# Patient Record
(demographics unavailable — no encounter records)

---

## 2024-11-04 NOTE — HEALTH RISK ASSESSMENT
[No] : In the past 12 months have you used drugs other than those required for medical reasons? No [No falls in past year] : Patient reported no falls in the past year [0] : 2) Feeling down, depressed, or hopeless: Not at all (0) [Never] : Never [de-identified] : URO [[id number]] : 0

## 2024-11-04 NOTE — HEALTH RISK ASSESSMENT
[No] : In the past 12 months have you used drugs other than those required for medical reasons? No [No falls in past year] : Patient reported no falls in the past year [0] : 2) Feeling down, depressed, or hopeless: Not at all (0) [Never] : Never [de-identified] : URO [[id number]] : 0

## 2024-11-04 NOTE — HISTORY OF PRESENT ILLNESS
[de-identified] : 58 year old  female patient with history of stable Essential Hypertension, Chronic Systolic CHF, Nonischemic Cardiomyopathy, Acquired Hypothyroidism, Reactive Airway Disease, Elevated Hemoglobin A1c, history as stated, presented for follow up examination. Patient is compliant with all medications. ROS as stated.

## 2024-11-04 NOTE — ASSESSMENT
[FreeTextEntry1] : 58 year old female found to have stable Essential Hypertension, Chronic Systolic CHF, Nonischemic Cardiomyopathy, Acquired Hypothyroidism, Reactive Airway Disease, Elevated Hemoglobin A1c, with the current prescription regimen as recommended, diet and lifestyle modifications, as counseled. Prior results reviewed, interpreted and discussed with the patient during today's examination, as appropriate. Follow up, treatment plan and tests, as ordered.   Total time spent:: 30 minutes Including: Preparation prior to visit - Reviewing prior record, results of tests and Consultation Reports as applicable Conducting an appropriate H & P during today's encounter Appropriate orders for tests, medications and procedures, as applicable Counseling patient Note completion

## 2024-11-04 NOTE — HISTORY OF PRESENT ILLNESS
[de-identified] : 58 year old  female patient with history of stable Essential Hypertension, Chronic Systolic CHF, Nonischemic Cardiomyopathy, Acquired Hypothyroidism, Reactive Airway Disease, Elevated Hemoglobin A1c, history as stated, presented for follow up examination. Patient is compliant with all medications. ROS as stated.

## 2025-01-10 NOTE — HISTORY OF PRESENT ILLNESS
[de-identified] : MICHAEL STEIN is a 58 year old female presenting with PMH of cardiomyopathy presenting today for 1-1/2-week history of left-sided foot pain without any specific trauma.  Patient states she goes to the gym regularly a few times a week and does not believe she has had an increase in activity typically uses the treadmill and elliptical.  She has switched to the rowing machine due to the pain recently.  She saw her primary care doctor and was referred over for further evaluation.  Denies any significant swelling or bruising of the ankle.  Most of the pain is on the anterior aspect of the foot and ankle down to the arch of the foot.  Does not have any pain at the heel itself.

## 2025-01-10 NOTE — PHYSICAL EXAM
[de-identified] : Foot/ankle (left)   Inspection  Skin: normal  Swelling: none  Arch: Pes planus, flexible worse with weightbearing Tendon defect: none   Palpation  Tenderness: Moderate Location: Tib post tendon insertion   Ankle ROM  Dorsiflexion: normal  Plantarflexion: normal  Eversion: normal  Inversion: normal, painful Toe flexion: normal  Toe extension: normal  Foot Motor Strength  Ankle plantarflexion: 5/5  Dorsiflexion: 5/5  Inversion: 5/5  Eversion: 5/5   Sensory index  Normal Distal pulses Normal   Stress test  Negative: ankle anterior drawer, ankle lateral tilt, subtalar inversion, subtalar eversion  Positive:   Special Tests  Knott test: normal  Kleigers test: normal  Tinnels: normal

## 2025-01-10 NOTE — DISCUSSION/SUMMARY
[de-identified] : MICHAEL STEIN is a 58 year old female presenting with PMH of cardiomyopathy presenting with 1 to 2-week history of left-sided foot and ankle pain that appears to be related to tibialis posterior tendinitis and dysfunction.  Patient also with moderate pes planus on weightbearing.  Plan: 1.  Home exercise program provided for foot intrinsic strengthening 2.  Discussed Green Superfeet inserts to be worn to help with arch support 3.  Patient may continue ankle support and brace as needed 4.  Patient may continue exercise that does not flareup her pain more than a 3 out of 10 5.  Patient will follow-up as needed can consider formal PT in the future 6.  Recommend topical medication such as Aspercreme Biofreeze and/or Tylenol for pain control.

## 2025-02-03 NOTE — HEALTH RISK ASSESSMENT
[No] : In the past 12 months have you used drugs other than those required for medical reasons? No [No falls in past year] : Patient reported no falls in the past year [0] : 2) Feeling down, depressed, or hopeless: Not at all (0) [Never] : Never [de-identified] : ORTHO [[id number]] : 0

## 2025-02-03 NOTE — HEALTH RISK ASSESSMENT
[No] : In the past 12 months have you used drugs other than those required for medical reasons? No [No falls in past year] : Patient reported no falls in the past year [0] : 2) Feeling down, depressed, or hopeless: Not at all (0) [Never] : Never [de-identified] : ORTHO [[id number]] : 0

## 2025-02-03 NOTE — HISTORY OF PRESENT ILLNESS
[de-identified] : 58 year old  female patient with history of stable Essential Hypertension, Chronic Systolic CHF, Nonischemic Cardiomyopathy, Acquired Hypothyroidism, Reactive Airway Disease, Elevated Hemoglobin A1c, history as stated, presented for follow up examination. Patient is compliant with all medications. ROS as stated.

## 2025-02-03 NOTE — HISTORY OF PRESENT ILLNESS
[de-identified] : 58 year old  female patient with history of stable Essential Hypertension, Chronic Systolic CHF, Nonischemic Cardiomyopathy, Acquired Hypothyroidism, Reactive Airway Disease, Elevated Hemoglobin A1c, history as stated, presented for follow up examination. Patient is compliant with all medications. ROS as stated.

## 2025-02-25 NOTE — ASSESSMENT
[FreeTextEntry1] : Very pleasant 57-year-old woman who presents for follow-up of right kidney stone -Ultrasound images reviewed demonstrating no kidney stones, bilaterally, whereas previous ultrasound 6 months ago demonstrated a 5.3 mm mm right lower pole renal stone without significant change and no new stones -We discussed potential etiologies of nonvisualization of a stone previously visualized on renal ultrasound -Repeat renal ultrasound in 6 months and follow-up at that time   I have spent 20 minutes on this encounter, exclusive of separately billed services

## 2025-02-25 NOTE — HISTORY OF PRESENT ILLNESS
[FreeTextEntry1] : Very pleasant 58-year-old woman who presents for follow-up of right lower pole kidney stone.  She feels well.  She denies dysuria.  No hematuria.  No flank pain or suprapubic pain.  She underwent a repeat renal ultrasound today which demonstrated no kidney stones, bilaterally.  Ultrasound 6 months ago demonstrated a 5.3 mm right lower pole renal stone.  No other complaints.

## 2025-05-16 NOTE — HISTORY OF PRESENT ILLNESS
[FreeTextEntry1] : Ms. 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   For full initial details, please see note from 5/1/23.   Since last visit, has been stable. Prior remote transmission showed multiple SVT/ST events, longest event lasting ~11 minutes and 33 seconds with rates of 143bpm. BB not increased at that time as she recalls being lightheaded on higher doses. Has not had any noted on recent interrogation.   States she is unable to work out for more than ~30mins which is less than previously when she was able to perform 1-hour sessions.   Being followed by weight management for her weight. Wasn't given GLP-1 in past due to insurance issues and possible gastroparesis.   She denies chest pain and no CRT-D shocks.   Has had episodic lightheadedness which is debilitating, sometimes position related. Stays hydrated.

## 2025-05-16 NOTE — ASSESSMENT
[FreeTextEntry1] : Ms. 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 Currently stage C HF, endorsing NYHA class I-II symptoms and appears euvolemic on exam. Prior ischemic testing to evaluate for CAD showed defects which are unlikely due to epicardial CAD as prior deficits were present in 2016 despite normal angiogram and repeat study without worsening or new areas with defects. However, prior basal septal territory with fixed defect suggestive of scarring.  1. HF recovered EF - - 11/2023 TTE with LVEF 51% - Counseled on weight loss and diet; continue follow up with nutritionist has done well with diet and exercise; may be good candidate for gastric sleeve vs. Ozempic; encouraged f/u with Dr. Welch - on Entresto  BID; will reduce to 49/51 twice/day to see if lightheadedness improves - c/w Toprol XL 50 mg daily; HR at goal and previously intolerant of higher doses.  - continue spironolactone 25 mg daily  2. Dyspnea -  - pharmacologic stress test 8/4/21 with less ischemia and normal LVEF; unlikely d/t epicardial CAD and may represent microvascular disease. - lipid panel in February LDL 82 - Follows Dr. Franco - Event monitor 2024   3. Weight loss - as above - A1c 5.7 from 6.0 - continue metformin 1500 mg daily - Follows Dr. Franco  RTC 6 months with PA

## 2025-05-16 NOTE — PHYSICAL EXAM
[Well Developed] : well developed [Well Nourished] : well nourished [No Acute Distress] : no acute distress [Normal Conjunctiva] : normal conjunctiva [No Carotid Bruit] : no carotid bruit [Normal S1, S2] : normal S1, S2 [No Murmur] : no murmur [No Rub] : no rub [No Gallop] : no gallop [Clear Lung Fields] : clear lung fields [Good Air Entry] : good air entry [No Respiratory Distress] : no respiratory distress  [Soft] : abdomen soft [Non Tender] : non-tender [No Masses/organomegaly] : no masses/organomegaly [Normal Bowel Sounds] : normal bowel sounds [Normal Gait] : normal gait [No Edema] : no edema [No Cyanosis] : no cyanosis [No Clubbing] : no clubbing [No Varicosities] : no varicosities [No Rash] : no rash [No Skin Lesions] : no skin lesions [Moves all extremities] : moves all extremities [No Focal Deficits] : no focal deficits [Normal Speech] : normal speech [Alert and Oriented] : alert and oriented [Normal memory] : normal memory [de-identified] : JVP ~ 6 cm  w/o HJR [de-identified] : warm peripherally

## 2025-05-16 NOTE — CARDIOLOGY SUMMARY
Medication/Dose: sumatriptan 50 mg  Patient last seen by PCP: 03/26/2021  Next office visit with PCP: none scheduled   Last Lab:09/28/2020    Above information requires contacting patient: Yes, medication was just refilled 12/21/2021    Prescription does not require PDMP check    Sent to provider to approve or deny       [de-identified] : 5/16/25 - NSR, BiV paced 5/13/24 ECG: NSR, BiV paced 11/6/23 ECG: NSR, HR 68, low voltage in limb leads 5/1/23 ECG: NSR, BiV paced, low voltage in limb leads 5/6/22 Atrial paced 50 with BiV paced 7/12/21 - NSR, HR 71, BiV paced 10/5/20 - NSR, BiV paced 10/21/19 - NSr, HR 71, Biv paced 2/4/19 - unchanged 10/22/18 - NSR, HR 60, V paced; narrow QRS 10/18/17 - NSR, HR 74, Vpaced,  ms, LBBB morphology [de-identified] : 12/4/23 Pharm Stress: NSR 53bpm, no significant ischemic ST changes beyond baseline abnormalities, no arrhytmia; w/ stress, small to moderate defect in the apex that corrects with prone imaging, sml sized mild-mod defects in the basal septal wall that is fixed suggestive of scar, LVEF during stress 64%.   8/4/21 -  There are medium sized, mild defects in basal anterior, mid anterolateral walls reversible suggestive of mild ischemia; large mild-mod defects anteroseptal, distal anterior and distal anterolateral and apical walls mostly fixed suggestive of infarct; medium-sized defects in basal-mid inferolateral, basal inferior, and basal inferoseptal walls mostly fixed c/w infarct. Post LVEF 70% [de-identified] : 11/22/23 TTE: LVEF 51%, no RWMA, grade I DD, mld RVE, mld MR, hypermobile intraatrial septum,  11/7/19 - nl BiV function 8/7/18 - nl biventricular function 10/18/17 - EF 50-55%, LVEDD 5 cm, mild MR, no wall motion abnl, normal RV size and function  8/9/16 - EF 25%, LVEDD 6.1 cm, moderate MR, AK of inferior and inferoseptal wall with diffuse HK of remainder walls [de-identified] : 5/23/16 - EF 12%, no LGE [de-identified] : \par  5/19/16 - normal coronaries, LVEDP 35

## 2025-06-10 NOTE — PHYSICAL EXAM
[de-identified] : Foot/ankle (left)   Inspection  Skin: normal  Swelling: none  Arch: Pes planus, flexible worse with weightbearing Tendon defect: none   Palpation  Tenderness: Moderate Location: Tib post tendon insertion   Ankle ROM  Dorsiflexion: normal  Plantarflexion: normal  Eversion: normal  Inversion: normal, painful Toe flexion: normal  Toe extension: normal  Foot Motor Strength  Ankle plantarflexion: 5/5  Dorsiflexion: 5/5  Inversion: 5/5  Eversion: 5/5   Sensory index  Normal Distal pulses Normal   Stress test  Negative: ankle anterior drawer, ankle lateral tilt, subtalar inversion, subtalar eversion  Positive:   Special Tests  Knott test: normal  Kleigers test: normal  Tinnels: normal

## 2025-06-10 NOTE — DISCUSSION/SUMMARY
[de-identified] : MICHAEL STEIN is a 58 year old female presenting with PMH of cardiomyopathy presenting with 1 to 2-week history of left-sided foot and ankle pain that appears to be related to tibialis posterior tendinitis and dysfunction.  Patient also with moderate pes planus on weightbearing.  Patient with only mild relief with home exercise program shoe inserts and over-the-counter medications.  Cannot undergo MRI unless absolutely necessary due to implantable cardiac device.  Plan: 1.    Referral given to formal physical therapy 2.  Patient will continue Superfeet inserts, will remove other sole from shoe. 3.  Patient will follow-up in 3 months for reevaluation.  Can consider tib post tendon sheath CSI in the future if no improvement.

## 2025-06-10 NOTE — HISTORY OF PRESENT ILLNESS
[de-identified] : MICHAEL STEIN is a 58 year old female presenting with PMH of cardiomyopathy presenting today for 1-1/2-week history of left-sided foot pain without any specific trauma.  Patient states she goes to the gym regularly a few times a week and does not believe she has had an increase in activity typically uses the treadmill and elliptical.  She has switched to the rowing machine due to the pain recently.  She saw her primary care doctor and was referred over for further evaluation.  Denies any significant swelling or bruising of the ankle.  Most of the pain is on the anterior aspect of the foot and ankle down to the arch of the foot.  Does not have any pain at the heel itself.  Interval hx: Patient has been doing home exercise program that is helpful in the moment but after she finishes the exercises she feels the pain comes back.  She has been using the inserts which are only somewhat helpful.  She would like to discuss further options.  Of note patient has heart failure and has an ICD and makes it difficult for MRIs.

## 2025-06-12 NOTE — REVIEW OF SYSTEMS
[Muscle Pain] : muscle pain [TextEntry] : CARDIOVASCULAR: Negative RESPIRATORY: Negative GASTROINTESTINAL: Negative NEUROLOGICAL: Negative

## 2025-06-12 NOTE — PHYSICAL EXAM
[TextEntry] : PULMONARY:  No respiratory distress and lungs were clear to auscultation bilaterally. HEART:  Heart rate was normal and rhythm regular, normal S1 and S2, no gallops/murmur/pericardial rub. VASCULAR:  No peripheral edema. ABDOMEN:  Normal bowel sounds, soft, non-tender, no hepatosplenomegaly and no abdominal mass palpated. NEUROLOGICAL: Normal gait and reflexes, no focal deficits.

## 2025-06-12 NOTE — ASSESSMENT
[FreeTextEntry1] : 58 year old female found to have stable Essential Hypertension, Chronic Systolic CHF, Nonischemic Cardiomyopathy, Acquired Hypothyroidism, Reactive Airway Disease, Elevated Hemoglobin A1c, Hyperlipidemia, with the current prescription regimen as recommended, diet and lifestyle modifications, as counseled. Prior results reviewed, interpreted and discussed with the patient during today's examination, as appropriate. Follow up, treatment plan and tests, as ordered.   Total time spent:: 30 minutes Including: Preparation prior to visit - Reviewing prior record, results of tests and Consultation Reports as applicable Conducting an appropriate H & P during today's encounter Appropriate orders for tests, medications and procedures, as applicable Counseling patient Note completion

## 2025-06-12 NOTE — HEALTH RISK ASSESSMENT
[No] : In the past 12 months have you used drugs other than those required for medical reasons? No [No falls in past year] : Patient reported no falls in the past year [0] : 2) Feeling down, depressed, or hopeless: Not at all (0) [Never] : Never [de-identified] : URO/ORTHO/CARD [[id number]] : 0

## 2025-06-12 NOTE — HEALTH RISK ASSESSMENT
[No] : In the past 12 months have you used drugs other than those required for medical reasons? No [No falls in past year] : Patient reported no falls in the past year [0] : 2) Feeling down, depressed, or hopeless: Not at all (0) [Never] : Never [de-identified] : URO/ORTHO/CARD [[id number]] : 0

## 2025-06-12 NOTE — HISTORY OF PRESENT ILLNESS
[de-identified] : 58 year old  female patient with history of stable Essential Hypertension, Chronic Systolic CHF, Nonischemic Cardiomyopathy, Acquired Hypothyroidism, Reactive Airway Disease, Elevated Hemoglobin A1c, Hyperlipidemia, history as stated, presented for follow up examination. Patient is compliant with all medications. ROS as stated.

## 2025-06-12 NOTE — HISTORY OF PRESENT ILLNESS
[de-identified] : 58 year old  female patient with history of stable Essential Hypertension, Chronic Systolic CHF, Nonischemic Cardiomyopathy, Acquired Hypothyroidism, Reactive Airway Disease, Elevated Hemoglobin A1c, Hyperlipidemia, history as stated, presented for follow up examination. Patient is compliant with all medications. ROS as stated.

## 2025-06-18 NOTE — PHYSICAL EXAM
[No Acute Distress] : no acute distress [Well Nourished] : well nourished [Well Developed] : well developed [Low Lying Soft Palate] : low lying soft palate [Enlarged Base of the Tongue] : enlarged base of the tongue [III] : Mallampati Class: III [1+] : Right Tonsil: 1+ [Normal Appearance] : normal appearance [Supple] : supple [No Neck Mass] : no neck mass [No JVD] : no jvd [Normal Rate/Rhythm] : normal rate/rhythm [Normal S1, S2] : normal s1, s2 [No Murmurs] : no murmurs [No Resp Distress] : no resp distress [Clear to Auscultation Bilaterally] : clear to auscultation bilaterally [Benign] : benign [Not Tender] : not tender [No Masses] : no masses [No Focal Deficits] : no focal deficits [Oriented x3] : oriented x3

## 2025-06-19 NOTE — REVIEW OF SYSTEMS
[Dyspnea] : dyspnea [SOB on Exertion] : sob on exertion [Negative] : Constitutional [Cough] : no cough [Hemoptysis] : no hemoptysis [Chest Tightness] : no chest tightness [Sputum] : no sputum [Pleuritic Pain] : no pleuritic pain [Wheezing] : no wheezing [A.M. Dry Mouth] : no a.m. dry mouth

## 2025-06-19 NOTE — HISTORY OF PRESENT ILLNESS
[Obstructive Sleep Apnea] : obstructive sleep apnea [Never] : never [TextBox_4] : This is a 57 yo F w/ PMHx of cardiomyopathy is here for follow up for sleep apnea. Her last appointment was over 2 years ago. She is feeling well today, she is compliant with her CPAP. She feels that it helps her sleep. She does feel tired during the day but is associating it with her anxiety, since she has been waking up during the night and finds it harder to fall asleep. Pt mentioned that her CPAP mask drips water and would like that to be resolved  She is using CPAP.  She feels it is making abnormal noise and building up water.   She has been diagnosed with non- ischemic cardiomyopathy. She has improved but remains on regimen.  Her echo revealed improved EF.  She has had episodes of bronchitis in the past. She has no history of obstructive lung diseases. She is also concerned because she has been gaining weight.    Cardiomyopathy is improved but remains on Entresto and diuretic. She has AICD. She is followed by MAIRA Hayden  She is using CPAP with benefit  Humidity issue is resolved  She has received new mask and tubing  She follows with cardiology  PSH: ICD/PPM  ovarian cyst, removed fallopian tube  partial thyroidectomy for thyroid nodule  breast biopsy, benign  FH mother breast cancer    PMH: nonischemic cardiomyopathy  bouts of bronchitis, episode of pneumonia, not hospitalized  hiatus hernia, GERD  Cardiomyopathy is improved but remains on Entresto and diuretic. She has AICD.  She is followed by MAIRA Hayden    non smoker  ROS No history of asthma, no sinus problems

## 2025-06-19 NOTE — PROCEDURE
[FreeTextEntry1] : PFT   normal spirometry, lung volumes and diffusion   Test needed to adequately evaluate and manage the condition        Yves Hughes MD [facility]

## 2025-06-19 NOTE — DISCUSSION/SUMMARY
[FreeTextEntry1] : 56 year old woman with obstructive sleep apnea   Her CPAP was a problem with humidity and water in tubing  She received new mask, problem has resolved  She has been using CPAP with benefit however.  She reports having bronchitis treated with prednisone  She has had normalized EF, remains on Entresto  PFT is normal today, without OAD  Hercough is likely postnasal drip  She may use  internasal steroid at night an , if needed may use Flonase  Plan  continue CPAP  follow with cardiology   Total time spent : 30 minutes Including: Preparation prior to visit - Reviewing prior record, results of tests and Consultation Reports as applicable Conducting an appropriate H & P during today's encounter Appropriate orders for tests, medications and procedures, as applicable Counseling patient  Note completion    Yves Hughes MD